# Patient Record
Sex: MALE | Race: WHITE | NOT HISPANIC OR LATINO | Employment: OTHER | URBAN - METROPOLITAN AREA
[De-identification: names, ages, dates, MRNs, and addresses within clinical notes are randomized per-mention and may not be internally consistent; named-entity substitution may affect disease eponyms.]

---

## 2022-02-21 ENCOUNTER — CONSULTATION/EVALUATION (OUTPATIENT)
Dept: URBAN - METROPOLITAN AREA CLINIC 35 | Facility: CLINIC | Age: 62
End: 2022-02-21

## 2022-02-21 DIAGNOSIS — H25.812: ICD-10-CM

## 2022-02-21 DIAGNOSIS — H25.811: ICD-10-CM

## 2022-02-21 PROCEDURE — V2799PMN IMPRIMIS PRED-MOXI-NEPAF 5ML

## 2022-02-21 PROCEDURE — 92136TC50 INTERFEROMETRY - TECHNICAL COMPONENT

## 2022-02-21 PROCEDURE — 92004 COMPRE OPH EXAM NEW PT 1/>: CPT

## 2022-02-21 ASSESSMENT — VISUAL ACUITY
OS_PH: 20/50
OS_SC: J3
OS_AM: 20/30+2
OD_SC: 20/400
OS_SC: 20/200
OD_PH: 20/60
OD_RAM: 20/25
OD_SC: J2+

## 2022-02-21 ASSESSMENT — TONOMETRY
OS_IOP_MMHG: 12
OD_IOP_MMHG: 11

## 2022-02-21 NOTE — PATIENT DISCUSSION
The types of intraocular lenses were reviewed with the patient along with a discussion of their various strengths and weaknesses. Basic Andreina.

## 2022-02-21 NOTE — PATIENT DISCUSSION
pt wants the best night driving distance and is aware his reading will not be there, he will need help with anything arms length in like readers and may potentially get worse as he gets older. No ADV lens either CV or Basic Andreina OU. Basic Andreina OU.

## 2022-03-02 ENCOUNTER — SURGERY/PROCEDURE (OUTPATIENT)
Dept: URBAN - METROPOLITAN AREA CLINIC 35 | Facility: CLINIC | Age: 62
End: 2022-03-02

## 2022-03-02 DIAGNOSIS — H25.811: ICD-10-CM

## 2022-03-02 PROCEDURE — 6698454 REMOVE CATARACT;INSERT LENS (SX ONLY)

## 2022-04-07 ENCOUNTER — SURGERY/PROCEDURE (OUTPATIENT)
Dept: URBAN - METROPOLITAN AREA CLINIC 35 | Facility: CLINIC | Age: 62
End: 2022-04-07

## 2022-04-07 ENCOUNTER — POST OP/EVAL OF SECOND EYE (OUTPATIENT)
Dept: URBAN - METROPOLITAN AREA SURGERY 14 | Facility: SURGERY | Age: 62
End: 2022-04-07

## 2022-04-07 DIAGNOSIS — H25.812: ICD-10-CM

## 2022-04-07 DIAGNOSIS — Z96.1: ICD-10-CM

## 2022-04-07 PROCEDURE — 99212 OFFICE O/P EST SF 10 MIN: CPT

## 2022-04-07 PROCEDURE — 6698454 REMOVE CATARACT;INSERT LENS (SX ONLY)

## 2022-04-07 ASSESSMENT — VISUAL ACUITY
OS_SC: 20/200
OD_SC: 20/30-1
OS_BAT: 20/200

## 2022-04-07 ASSESSMENT — TONOMETRY
OD_IOP_MMHG: 12
OS_IOP_MMHG: 12